# Patient Record
Sex: MALE | Race: WHITE | Employment: OTHER | URBAN - METROPOLITAN AREA
[De-identification: names, ages, dates, MRNs, and addresses within clinical notes are randomized per-mention and may not be internally consistent; named-entity substitution may affect disease eponyms.]

---

## 2020-10-05 NOTE — PROGRESS NOTES
Neurology Note    Patient ID:  Chano Sanchez  [id number]  74 y.o.  1960      Date of Consultation:  October 6, 2020    Referring Physician: Dr. Faiza Patel    Reason for Consultation:  Movement difficulties. Subjective: my memory and movement disorders. History of Present Illness:   Chano Sanchez is a 64 y.o. male who was referred to the neurology clinic at [facility] for an evaluation. The patient presents today with his sister provides additional information. He has had continued progressive neurological decline at least for the last year. It is a combination of both balance issues and memory issues. He reports that he has had a couple of falls. He is turning much slower than he was previously. He does not turn well to the left. From a cognitive standpoint, he is having trouble with word finding. He is much slower in his speech. He does work as an optometrist but patients have mention to him that he just does not seem to be like he was previously. He initially saw a neurologist in [place] and did have an open MRI which was reportedly normal.  He then did see a neurologist in [place], Dr. Matthew. A follow-up MRI was completed. There was also serology that was performed which was all reportedly normal.  Unfortunately, I do not have any of this lab test with me today however will work on obtaining those results by having him sign release of information today. He continues to follow closely with his primary care doctor Dr. Faiza Patel who has also done additional testing. There is also some difficulty with swallowing. He is scheduled to have a swallowing study done later this week. He is also seen ENT due to his balance difficulties and he is scheduled to see a balance therapist later this week. His vision has been okay. Prior to all this, the gentleman was very active and engaged. He was a scratch golfer.   He also had a girlfriend but since this has all began, he has become more withdrawn and he does not see his girlfriend anymore. He has lost approximately 10 pounds. He does have trouble staying asleep. He is taking trazodone and melatonin and is not doing better. He is also developed depression and has now been started on a medication by his primary care doctor. He also is working on seeing a psychologist but right now that has become difficult due to the pandemic. No past medical history on file. Does have cardiac disease and is status post bypass surgery      No past surgical history on file. Bypass surgery    No family history on file. Mother with parkinson's disease      Social History     Tobacco Use    Smoking status: Not on file   Substance Use Topics    Alcohol use: Not on file      Denies etoh and tobacco.  No other drug use. Allergies not on file     Prior to Admission medications    Medication Sig Start Date End Date Taking? Authorizing Provider   aspirin delayed-release 81 mg tablet Take  by mouth daily. Yes Provider, Historical   FLUoxetine (PROzac) 20 mg capsule Take  by mouth daily. Yes Provider, Historical   traZODone (DESYREL) 50 mg tablet Take  by mouth nightly. Yes Provider, Historical   melatonin 5 mg tablet Take 10 mg by mouth nightly. Yes Provider, Historical       Review of Systems:    General, constitutional: falls  Eyes, vision: negative  Ears, nose, throat: negative  Cardiovascular, heart: chest pain  Respiratory: negative  Gastrointestinal: swallowing difficulties.    Genitourinary: negative  Musculoskeletal: negative  Skin and integumentary: negative  Psychiatric: anxiety, depression  Endocrine: negative  Neurological: negative, except for HPI  Hematologic/lymphatic: negative  Allergy/immunology: negative      Objective:     Visit Vitals  /68 (BP 1 Location: Left arm, BP Patient Position: Sitting)   Pulse 90   Temp 97.5 °F (36.4 °C)   Resp 18   Ht 5' 9\" (1.753 m)   Wt 166 lb (75.3 kg)   SpO2 97% BMI 24.51 kg/m²       Physical Exam:      General:  appears well nourished in no acute distress  Neck: no carotid bruits  Lungs: clear to auscultation  Heart:  no murmurs, regular rate  Lower extremity: peripheral pulses palpable and no edema  Skin: intact    Neurological exam:    Awake, alert, oriented to person, place and time. He is slow to respond but then does respond with quick abrupt statements. There is no flow to his speech. Recent and remote memory were normal  Attention and concentration were most likely intact but difficult due to the significant delay in response. Language was intact. There was no aphasia  Speech: Mild spastic dysarthria  Fund of knowledge was preserved    Cranial nerves:   II-XII were tested    Perrrla  Fundoscopic examination revealed venous pulsations and no clear abnormalities  Visual fields were full  Eomi, no evidence of nystagmus. Smooth pursuits  Facial sensation:  normal and symmetric  Facial motor: normal and symmetric  Hearing intact  SCM strength intact  Tongue: midline without fasciculations    Motor: Tone normal.  I did not notice any cogwheel rigidity today    No evidence of fasciculations    Strength testing:   deltoid triceps biceps Wrist ext. Wrist flex. intrinsics Hip flex. Hip ext. Knee ext. Knee flex Dorsi flex Plantar flex   Right 5 5 5 5 5 5 5 5 5 5 5 5   Left 5 5 5 5 5 5 5 5 5 5 5 5       Sensory:  Upper extremity: intact to pp, light touch, and vibration > 10 seconds  Lower extremity: intact to pp, light touch, and vibration > 10 seconds    Reflexes:    Right Left  Biceps  2 2  Triceps 2 2  Brachiorad. 2 2  Patella  2 2  Achilles 1 1    Plantar response:  flexor bilaterally      Cerebellar testing:  No resting tremor apparent, finger/nose and connie were intact however there is inconsistently in movements. Irregular rhythm. No fluidity. Difficulty with initiation of movement. Romberg: absent    Gait: wide based. Shuffling gait. Decreased arm swing. Turns en bloc. Labs:     No results found for: HBA1C, NA, K, CL, GLU, BUN, CREA, CA, WBC, HCT, HGB, PLT, LDL, MUK3OWWA, HCTEXT, HGBEXT, PLTEXT, [id number], HCTEXT, HGBEXT, PLTEXT    Imaging:    No results found for this or any previous visit. No results found for this or any previous visit. Assessment and Plan:      The patient is a pleasant 72-year-old gentleman with progressive neurological decline including balance difficulties, coordination, and cognitive concerns. His neurological examination does reveal evidence of cognitive slowing, difficulty with coordinated movements and gait instability. progressive neurological decline: The differential for this is quite broad and does include progressive neurodegenerative disorder in the tauopathy or syncleinopathy families. Reportedly, he has had a brain MRI that was normal.  We will work on obtaining those results for my review. I would like to obtain a DaTscan to look for diseases such as cortical basal ganglionic degeneration, Parkinson's plus disorders. Also the differential includes paraneoplastic and autoimmune basal ganglionic degeneration. I will send off the [facility] panel. I will also need to obtain all the serology that has been performed as I am unclear what has been done. The patient and his sister are also unaware. Pending those results will need to consider sending off a additional serology. I also did talk with them that I do think he most likely will need a lumbar puncture performed looking for different autoimmune and infectious disorders. I will start with the serology and GEM scan and obtain prior records. Cognitive decline:  I will arrange for him to have neuropsychological testing with Dr. Alem Hurley to help delineate the cognitive impairment. He will return to clinic after the above testing. There is no problem list on file for this patient.                   Signed By:  Lorri Darling DO FAAN    October 6, 2020

## 2020-10-06 ENCOUNTER — OFFICE VISIT (OUTPATIENT)
Dept: NEUROLOGY | Age: 60
End: 2020-10-06
Payer: COMMERCIAL

## 2020-10-06 ENCOUNTER — TELEPHONE (OUTPATIENT)
Dept: NEUROLOGY | Age: 60
End: 2020-10-06

## 2020-10-06 VITALS
TEMPERATURE: 97.5 F | BODY MASS INDEX: 24.59 KG/M2 | WEIGHT: 166 LBS | RESPIRATION RATE: 18 BRPM | DIASTOLIC BLOOD PRESSURE: 68 MMHG | HEIGHT: 69 IN | HEART RATE: 90 BPM | SYSTOLIC BLOOD PRESSURE: 106 MMHG | OXYGEN SATURATION: 97 %

## 2020-10-06 DIAGNOSIS — G31.84 MCI (MILD COGNITIVE IMPAIRMENT): Primary | ICD-10-CM

## 2020-10-06 DIAGNOSIS — G25.9 MOVEMENT DISORDER: ICD-10-CM

## 2020-10-06 PROCEDURE — 99205 OFFICE O/P NEW HI 60 MIN: CPT | Performed by: PSYCHIATRY & NEUROLOGY

## 2020-10-06 RX ORDER — TRAZODONE HYDROCHLORIDE 50 MG/1
100 TABLET ORAL
COMMUNITY

## 2020-10-06 RX ORDER — ASPIRIN 81 MG/1
TABLET ORAL DAILY
COMMUNITY

## 2020-10-06 RX ORDER — CHOLECALCIFEROL (VITAMIN D3) 125 MCG
10 CAPSULE ORAL
COMMUNITY

## 2020-10-06 RX ORDER — FLUOXETINE HYDROCHLORIDE 20 MG/1
40 CAPSULE ORAL DAILY
COMMUNITY

## 2020-10-06 NOTE — LETTER
10/6/20 Patient: Estefany Richard YOB: 1960 Date of Visit: 10/6/2020 Uzma Alcazar MD 
[address] [place] [id number] VIA Facsimile: [phone] Dear Uzma Alcazar MD, Thank you for referring Mr. Estefany Richard to [address] [place] [address] for evaluation. My notes for this consultation are attached. If you have questions, please do not hesitate to call me. I look forward to following your patient along with you. Sincerely, Elias Ruano, DO

## 2020-10-06 NOTE — TELEPHONE ENCOUNTER
Left message regarding the lab requisition will be mailed out today. Please call to confirm the message was received.

## 2020-10-06 NOTE — TELEPHONE ENCOUNTER
Patient's sister returned call, ID verified times 2. She would like the lab slip mailed to her home due to the patient is staying with her. She would like to let Dr. Andrey Kelly know. The patient has an appointment with Alexandro Fernandez on 11/17/20. He is a neuropsychologist would Dr. Andrey Kelly prefer his colleague or keep the appointment with Dr. Sergoi Short.

## 2020-10-06 NOTE — PROGRESS NOTES
Visit Vitals  /68 (BP 1 Location: Left arm, BP Patient Position: Sitting)   Pulse 90   Temp 97.5 °F (36.4 °C)   Resp 18   Ht 5' 9\" (1.753 m)   Wt 166 lb (75.3 kg)   SpO2 97%   BMI 24.51 kg/m²     Chief Complaint   Patient presents with    Other     lateral movement is inhibited, weakness in all limbs and balance and swallowing issues     Memory Loss     sometimes he remembers things and other times he is just lost,      Depression screening completed patient has a 14 mild major depression is his diagnosis.  Discussed with Dr. Bryan chicas

## 2020-10-07 ENCOUNTER — HOSPITAL ENCOUNTER (OUTPATIENT)
Dept: GENERAL RADIOLOGY | Age: 60
Discharge: HOME | End: 2020-10-07
Attending: OTOLARYNGOLOGY
Payer: COMMERCIAL

## 2020-10-07 DIAGNOSIS — R49.0 HOARSENESS: ICD-10-CM

## 2020-10-07 DIAGNOSIS — T17.998A ASPIRATION OF LIQUID: ICD-10-CM

## 2020-10-07 DIAGNOSIS — G47.30 SLEEP APNEA: ICD-10-CM

## 2020-10-07 DIAGNOSIS — Z00.8 OTHER SPECIFIED GENERAL MEDICAL EXAMINATION: ICD-10-CM

## 2020-10-07 DIAGNOSIS — J38.3 SPASTIC DYSPHONIA: ICD-10-CM

## 2020-10-07 DIAGNOSIS — R13.14 DYSPHAGIA, PHARYNGOESOPHAGEAL PHASE: ICD-10-CM

## 2020-10-07 DIAGNOSIS — R27.0 ATAXIA: ICD-10-CM

## 2020-10-07 DIAGNOSIS — Z78.9 NON-SMOKER: ICD-10-CM

## 2020-10-07 PROCEDURE — 92611 MOTION FLUOROSCOPY/SWALLOW: CPT

## 2020-10-07 PROCEDURE — 74230 X-RAY XM SWLNG FUNCJ C+: CPT

## 2020-10-07 NOTE — PROGRESS NOTES
[facility], [address] [facility],[address] STUDY  Patient: Babak Martin (35 y.o. male)  Date: 10/7/2020  Referring Provider:  Dr. Marian Conde:   The patient was accompanied by his sister. 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 He was noticeably slower cognitively and with an unusual gait pattern. Affect was flat. OBJECTIVE:   Past Medical History:   No past medical history on file. No past surgical history on file. Current Dietary Status:    Radiologist: Dr. Mendoza Cheek Views: Lateral;Fluoro  Patient Position: standing upright    Trial 1:   Consistency Presented: Thin liquid;Puree;Mechanical soft;Pill/Tablet   How Presented: Self-fed/presented;Cup/sip;Straw;Successive swallows       Bolus Acceptance: No impairment   Bolus Formation/Control: No impairment:     Propulsion: No impairment   Oral Residue: None   Initiation of Swallow: Triggered at base of tongue; No impairment   Timing: No impairment;Pooling 1-5 sec   Penetration: Trace; Before swallow; To laryngeal vestibule;From initial swallow   Aspiration/Timing: Silent ;Trace; After;From initial swallow   Pharyngeal Clearance: No residue                       Decreased Tongue Base Retraction?: No  Laryngeal Elevation: WFL (within functional limits)  Aspiration/Penetration Score: 8 (Aspiration-Contrast passes cords/glottis with no effort to eject, ie/silent aspiration)  Pharyngeal Symmetry: Not assessed  Pharyngeal-Esophageal Segment: No impairment       Oral Phase Severity: No impairment  Pharyngeal Phase Severity: Mild    ASSESSMENT :  Based on the objective data described above, the patient presents with functional oral phase but mild pharyngeal dysphagia. He had a mild swallow delay with purees. He tolerated single cup sips and consecutive cup sips with no aspiration.  He had trace silent aspiration with straw sips only due to swallow delay and reduced closure during the swallow. No pharyngeal residue noted. PLAN/RECOMMENDATIONS :  No dietary changes needed at this point. Small sips, may want to avoid straws as this is when he had trace silent aspiration. Recommended he minimize distractions when eating and drinking. No follow up needed unless his dysphagia worsens. COMMUNICATION/EDUCATION:   The above findings and recommendations were discussed with: the patient and his sister who verbalized understanding.     Thank you for this referral.  Swapnil Ortiz, SLP  Time Calculation: 20 mins

## 2020-10-07 NOTE — TELEPHONE ENCOUNTER
Returned call to patient's sister,ID verified times 2. She was made aware of the message stated by Dr. Ophelia Agosto. Patient's sister verbalized understanding.

## 2020-10-08 ENCOUNTER — DOCUMENTATION ONLY (OUTPATIENT)
Dept: NEUROLOGY | Age: 60
End: 2020-10-08

## 2020-10-09 ENCOUNTER — TELEPHONE (OUTPATIENT)
Dept: NEUROLOGY | Age: 60
End: 2020-10-09

## 2020-10-09 NOTE — TELEPHONE ENCOUNTER
DaTscan order     CPT [id number] No PA required per Bonifacio 10/9/20 at 1:53 p.m. Call Ref# [id number]. Dr. Lauro Duff - in network in [place] - in network w/ [facility]     Faxed to Lindsey Granados at [facility], notes, card and order.

## 2020-10-09 NOTE — TELEPHONE ENCOUNTER
Hi,    Yes, Momo Nagy is working on getting the [address] at [facility]. That is the only place they do those. I believe the only other place is [facility]. We should not do a lumbar puncture without patient being comfortable with the procedure. That is why we are going to start by sending off the labs to the [facility]. I believe that lab slip was mailed to them this week.     Thanks, Rashaun Fields

## 2020-10-09 NOTE — TELEPHONE ENCOUNTER
Re: Labs ordered by Dr. Vu Stewart. Per Tavia Nobles, mail lab order to patient for [employer] draw and then [employer] will submit to [facility]. See scanned documents today. This will go out in Monday's, 10/12/20 mail to patient.

## 2020-10-09 NOTE — TELEPHONE ENCOUNTER
Patient sister would like to schedule LP, she states he does not want that but she can talk him into it. I told her that it was important that the patient was okay with getting the LP, but she wanted it addressed with you anyway. I let her know that I would message  You concerning the situation. She also stated that they were set up for the [address] [place] [address] with [facility]. I told her that I would look into that as I was unaware that [facility] had this imaging. That for this test we had to send it out to [facility]. I did talk with Coordination of Care and they do not believe that they have a DaTscan. I will call them back to let them know that we are working at getting this done at [address] [place] [address].     Please advise

## 2020-10-09 NOTE — TELEPHONE ENCOUNTER
Called and spoke with patient sister and let her know that as far as Coordination of Care is concerned that they do not have a DaTscan. I let her know that we are working on the Prior Auth for [address] [place] [address].

## 2020-10-13 ENCOUNTER — TELEPHONE (OUTPATIENT)
Dept: NEUROLOGY | Age: 60
End: 2020-10-13

## 2020-10-13 NOTE — TELEPHONE ENCOUNTER
Re: Sherley Pena    rec'd call from [address] [place] [address] - she asked that I get prior approval on the Isotope as well. They are seeing insurance plans kick back in addition to the authorizations we get for the CPT [address]35. CPT for the Isotope is . [address] [place] [address]  pt's plan does not require a prior auth (just as the [id number] did not require one either). Confirmation for this call today:   [id number].

## 2020-10-14 ENCOUNTER — HOSPITAL ENCOUNTER (OUTPATIENT)
Dept: LAB | Age: 60
Discharge: HOME | End: 2020-10-14
Payer: COMMERCIAL

## 2020-10-14 PROCEDURE — 36415 COLL VENOUS BLD VENIPUNCTURE: CPT

## 2020-10-29 ENCOUNTER — TELEPHONE (OUTPATIENT)
Dept: NEUROLOGY | Age: 60
End: 2020-10-29

## 2020-10-29 ENCOUNTER — OFFICE VISIT (OUTPATIENT)
Dept: NEUROLOGY | Age: 60
End: 2020-10-29
Payer: COMMERCIAL

## 2020-10-29 DIAGNOSIS — R41.844 FRONTAL LOBE AND EXECUTIVE FUNCTION DEFICIT: ICD-10-CM

## 2020-10-29 DIAGNOSIS — G31.9 NEURODEGENERATIVE DISORDER (HCC): Primary | ICD-10-CM

## 2020-10-29 DIAGNOSIS — G31.84 MILD COGNITIVE IMPAIRMENT WITH MEMORY LOSS: ICD-10-CM

## 2020-10-29 LAB
Lab: NORMAL
REFERRAL TEST CODE OR MNEMONIC: NORMAL

## 2020-10-29 PROCEDURE — 96116 NUBHVL XM PHYS/QHP 1ST HR: CPT | Performed by: PSYCHOLOGIST

## 2020-10-29 NOTE — PROGRESS NOTES
This note will not be viewable in [facility] for the following reason(s). Likely risk of substantial harm from data that is generated from this evaluation. He will receive a full report at the time of his feedback. [facility] at [address]    Office:  [phone]  Fax: [phone]                 Initial Office Exam    Patient Name: Gina Lloyd  Age: 64 y.o. Gender: male   [facility] 23 years  Handedness: right handed   Presenting Concern: Cognitive Decline  Primary Care Physician: Constantine Giron MD  Referring Provider: Iverson Gosselin, DO      REASON FOR REFERRAL:  This comprehensive and medically necessary neuropsychological assessment was requested to assist with a differential diagnosis of MCI. The use and purpose of this examination, as well as the extent and limitations of confidentiality, were explained prior to obtaining permission to participate. Instructions were provided regarding the necessity to put forth optimal effort and answer questions truthfully in order to obtain reliable and accurate test results. PERTINENT HISTORY:  Mr. Shaheen Liang presented for a neuropsychological assessment at the recommendation of his treating physician secondary to complaints of starting and completing activities, less engaged in activites, more withdrawn, less likely to maintain a conversation, jumps around in conversation, motorically agitated, depressed, sleep problems, feeling like he is a burden on others, feeling slowed down, impaired gait, and increased rigidity of movement in general resulting in frequent falls. He reportedly has lost 10 LBS in recent months. Mr. Shaheen Liang began noticing symptoms possibly 1.5 years ago.   From a brief review of his medical and personal history there has not been any other significant neurological injury or illness noted or reported. He did report experiencing depression or anxiety in the past last year and half. Alcohol abuse has been an issue in the past.    Mr. Annetta Rothman does not  report any problems at birth or difficulties meeting developmental milestones. He reports that he had an adequate level of family support and was not subject to trauma or abuse as a child. Mr. Annetta Rothman does not  report being retain in school or receiving special assistance in any of he classes or subjects. Mr. Annetta Rothman completed 20 years of education. Mr. Annetta Rothman does not  exercise on a regular basis and does maintain a balanced diet. He does not  report problems with sleep and does not  complain of pain. He does not  participate in mentally stimulating activities. Mr. Annetta Rothman does  have concerns medical and financial stressors. Mr. Annetta Rothman indicated that he is independent in his instrumental activities of daily living, including shopping, meal preparation, housekeeping, doing laundry, managing medications, and finances. Current Outpatient Medications   Medication Sig    aspirin delayed-release 81 mg tablet Take  by mouth daily.  FLUoxetine (PROzac) 20 mg capsule Take  by mouth daily.  traZODone (DESYREL) 50 mg tablet Take  by mouth nightly.  melatonin 5 mg tablet Take 10 mg by mouth nightly. No current facility-administered medications for this visit. No past medical history on file. No flowsheet data found. PHQ 9 Score: 14 (10/6/2020  8:12 AM)    No past surgical history on file. Social History     Socioeconomic History    Marital status:      Spouse name: Not on file    Number of children: Not on file    Years of education: Not on file    Highest education level: Not on file       No family history on file. CT Results (most recent):  No results found for this or any previous visit.   MRI Results (most recent):  No results found for this or any previous visit. MENTAL STATUS:    Orientation:  Oriented to year, season, day of month and day of week   Eye Contact:  Decreased, inconsistent   Motor Behavior:  Postural Rigidity, imbalance, and frequent falls   Speech:  Slow spastic dysarthria with word repetition   Thought Process: Mild tangentality   Thought Content: No of hallucinations or delusions   Suicidal ideations:  Mild suicidal ideation   Mood:   Depression and anxiety   Affect:   Flat to restricted affect, uses \"humor\" to manage anxiety. Concentration:   Mildly to moderately impaired   Abstraction:   Mildly impaired   Insight:   Adequate    Mild memory impairment, Construction Apraxia, poor planning (markedly impaired clock drawing), marked tremor when writing and drawing. On the Modified Mini-Mental Status Exam: 77/100 (< .01) Severely Impaired      DIAGNOSTIC IMPRESSIONS:    ICD-10-CM ICD-9-CM    1. Neurodegenerative disorder ([address]  G31.9 331.9    2. Mild cognitive impairment with memory loss  G31.84 331.83    3. Frontal lobe and executive function deficit  R41.844 799.55              PLAN:  1. Complete a comprehensive neuropsychological assessment to provide a differential diagnosis of presenting concerns as well as to assist with disposition and treatment planning as appropriate. 2. Consider compensatory and remedial cognitive training. 3. Consider Psychotherapy with a rehabilitation Psychologist or clinical psychologist with experience working with chronic conditions. Referral to [facility] or [facility]. 4. Consider an adaptive driving evaluation. 5. Consider referral for elder health nurse to provide an in-home functional assessment. 6. Consider placement issues to provide greater structure and supervision to ensure safety, health and well-being. [id number] x 1 Review of records. Face to face interview w/ patient. Determine test protocol: 60 minutes.  Total 1 unit        Kathleen Quiles, PhD, ABPP, LCP  Licensed Clinical Psychologist/ Neuropsychologist        This note will not be viewable in [address]

## 2020-10-29 NOTE — LETTER
10/29/20 Patient: Gina Lloyd YOB: 1960 Date of Visit: 10/29/2020 Romana Needle, DO 
[address] [place] [address] [address] [id number] VIA In Basket Dear Romana Needle, DO, Thank you for referring Mr. Gina Lloyd to [address] [place] [address] for evaluation. My notes for this consultation are attached. This note will not be viewable in [email or web address] for the following reason(s). Likely risk of substantial harm from data that is generated from this evaluation. He will receive a full report at the time of his feedback. [facility] at [address] Medical Office Building [address] Office:  [phone]  Fax: [phone] Initial Office Exam 
 
Patient Name: Gina Lloyd Age: 64 y.o. Gender: male Occupation: 20 years Handedness: right handed Presenting Concern: Cognitive Decline Primary Care Physician: Constantine Giron MD 
Referring Provider: Iverson Gosselin, DO 
 
 
REASON FOR REFERRAL: 
This comprehensive and medically necessary neuropsychological assessment was requested to assist with a differential diagnosis of MCI. The use and purpose of this examination, as well as the extent and limitations of confidentiality, were explained prior to obtaining permission to participate. Instructions were provided regarding the necessity to put forth optimal effort and answer questions truthfully in order to obtain reliable and accurate test results.  
 
PERTINENT HISTORY: 
Mr. Shaheen Liang presented for a neuropsychological assessment at the recommendation of his treating physician secondary to complaints of starting and completing activities, less engaged in activites, more withdrawn, less likely to maintain a conversation, jumps around in conversation, motorically agitated, depressed, sleep problems, feeling like he is a burden on others, feeling slowed down, impaired gait, and increased rigidity of movement in general resulting in frequent falls. He reportedly has lost 10 LBS in recent months. Mr. Stacey Barakat began noticing symptoms possibly 1.5 years ago. From a brief review of his medical and personal history there has not been any other significant neurological injury or illness noted or reported. He did report experiencing depression or anxiety in the past last year and half. Alcohol abuse has been an issue in the past. 
 
Mr. Stacey Barakat does not  report any problems at birth or difficulties meeting developmental milestones. He reports that he had an adequate level of family support and was not subject to trauma or abuse as a child. Mr. Stacey Barakat does not  report being retain in school or receiving special assistance in any of he classes or subjects. Mr. Stacey Barakat completed 20 years of education. Mr. Stacey Barakat does not  exercise on a regular basis and does maintain a balanced diet. He does not  report problems with sleep and does not  complain of pain. He does not  participate in mentally stimulating activities. Mr. Stacey Barakat does  have concerns medical and financial stressors. Mr. Stacey Barakat indicated that he is independent in his instrumental activities of daily living, including shopping, meal preparation, housekeeping, doing laundry, managing medications, and finances. Current Outpatient Medications Medication Sig  
 aspirin delayed-release 81 mg tablet Take  by mouth daily.  FLUoxetine (PROzac) 20 mg capsule Take  by mouth daily.  traZODone (DESYREL) 50 mg tablet Take  by mouth nightly.  melatonin 5 mg tablet Take 10 mg by mouth nightly. No current facility-administered medications for this visit. No past medical history on file. No flowsheet data found. PHQ 9 Score: 14 (10/6/2020  8:12 AM) No past surgical history on file. Social History Socioeconomic History  Marital status:  Spouse name: Not on file  Number of children: Not on file  Years of education: Not on file  Highest education level: Not on file No family history on file. CT Results (most recent): No results found for this or any previous visit. MRI Results (most recent): No results found for this or any previous visit. MENTAL STATUS: 
 
Orientation:  Oriented to year, season, day of month and day of week Eye Contact:  Decreased, inconsistent Motor Behavior:  Postural Rigidity, imbalance, and frequent falls Speech:  Slow spastic dysarthria with word repetition Thought Process: Mild tangentality Thought Content: No of hallucinations or delusions Suicidal ideations:  Mild suicidal ideation Mood:   Depression and anxiety Affect:   Flat to restricted affect, uses \"humor\" to manage anxiety. Concentration:   Mildly to moderately impaired Abstraction:   Mildly impaired Insight:   Adequate Mild memory impairment, Construction Apraxia, poor planning (markedly impaired clock drawing), marked tremor when writing and drawing. On the Modified Mini-Mental Status Exam: 77/100 (< .01) Severely Impaired DIAGNOSTIC IMPRESSIONS: 
  ICD-10-CM ICD-9-CM 1. Neurodegenerative disorder ([facility]  G31.9 331.9 2. Mild cognitive impairment with memory loss  G31.84 331.83   
3. Frontal lobe and executive function deficit  R41.844 799.55 PLAN: 
1. Complete a comprehensive neuropsychological assessment to provide a differential diagnosis of presenting concerns as well as to assist with disposition and treatment planning as appropriate. 2. Consider compensatory and remedial cognitive training. 3. Consider Psychotherapy with a rehabilitation Psychologist or clinical psychologist with experience working with chronic conditions.  Referral to [facility] or [facility]. 4. Consider an adaptive driving evaluation. 5. Consider referral for elder health nurse to provide an in-home functional assessment. 6. Consider placement issues to provide greater structure and supervision to ensure safety, health and well-being. 13512 x 1 Review of records. Face to face interview w/ patient. Determine test protocol: 60 minutes. Total 1 unit Rk Platt, PhD, ABPP, LCP Licensed Clinical Psychologist/ Neuropsychologist 
 
 
 
This note will not be viewable in [address] If you have questions, please do not hesitate to call me. I look forward to following your patient along with you.  
 
 
Sincerely, 
 
Rk Platt, PHD

## 2020-10-29 NOTE — TELEPHONE ENCOUNTER
Pt's sister would like to know if the labs have resulted. She is here in the office now with the patient for an appt with Dr. Qing Estrada.

## 2020-11-03 ENCOUNTER — TRANSCRIBE ORDER (OUTPATIENT)
Dept: NEUROLOGY | Age: 60
End: 2020-11-03

## 2020-11-03 NOTE — PROGRESS NOTES
Neurology Note    Patient ID:  Amrit Cantrell  [id number]  89 y.o.  1960      Date of Consultation:  November 4, 2020      Subjective: my memory and movement problems persist.        History of Present Illness:   Amrit Cantrell is a 64 y.o. male returns to the neurology clinic at [facility] for an evaluation. The patient was initially seen in the clinic on October 6, 2020. Please see my initial history and physical, examination, and treatment base plan from that day. Since that time, he has moved in with his sister. They do notice that his balance is getting slightly worse. He did have a fall in the bathroom which was later in the evening. He has trouble standing in 1 position. He is still having difficulty with falling asleep and staying asleep. His trazodone has been increased but has not shown much in the way of benefits. He did have the autoimmune encephalopathy panel sent to [facility] and that was unremarkable. He had his initial visit with Dr. Laya Solares from a neuropsychology standpoint. He does have his formal evaluation still pending. He has not been able to receive the DaTscan yet but that is scheduled for him next week. The patient does not go outside very much. His sister, who is with him today, does feel that he is quite resistant to going outside or to doing much of anything. There is no exercise or activity which is a notable change for the patient. No past medical history on file. Does have cardiac disease and is status post bypass surgery      No past surgical history on file. Bypass surgery    No family history on file. Mother with parkinson's disease  Nephew with schizophrenia      Social History     Tobacco Use    Smoking status: Not on file   Substance Use Topics    Alcohol use: Not on file      Denies etoh and tobacco.  No other drug use.        No Known Allergies     Prior to Admission medications    Medication Sig Start Date End Date Taking? Authorizing Provider   aspirin delayed-release 81 mg tablet Take  by mouth daily. Yes Provider, Historical   FLUoxetine (PROzac) 20 mg capsule Take 40 mg by mouth daily. Yes Provider, Historical   traZODone (DESYREL) 50 mg tablet Take 100 mg by mouth nightly. Yes Provider, Historical   melatonin 5 mg tablet Take 10 mg by mouth nightly. Yes Provider, Historical       Review of Systems:    General, constitutional: falls  Eyes, vision: negative  Ears, nose, throat: negative  Cardiovascular, heart: chest pain  Respiratory: negative  Gastrointestinal: swallowing difficulties. Genitourinary: negative  Musculoskeletal: negative  Skin and integumentary: negative  Psychiatric: anxiety, depression  Endocrine: negative  Neurological: negative, except for HPI  Hematologic/lymphatic: negative  Allergy/immunology: negative      Objective:     Visit Vitals  /72 (BP 1 Location: Right arm)   Pulse 94   Temp 97.5 °F (36.4 °C)   Wt 168 lb (76.2 kg)   SpO2 97%   BMI 24.81 kg/m²       Physical Exam:      General:  appears well nourished in no acute distress  Neck: no carotid bruits  Lungs: clear to auscultation  Heart:  no murmurs, regular rate  Lower extremity: peripheral pulses palpable and no edema  Skin: intact    Neurological exam:    Awake, alert, oriented to person, place and time. He is slow to respond but then does respond with quick abrupt statements. There is no flow to his speech. Recent and remote memory were normal  Attention and concentration were most likely intact but difficult due to the significant delay in response. Language was intact. There was no aphasia  Speech: Mild spastic dysarthria  Fund of knowledge was preserved    Cranial nerves:   II-XII were tested    Perrrla  Visual fields were full  Eomi, no evidence of nystagmus.   Smooth pursuits  Facial sensation:  normal and symmetric  Facial motor: normal and symmetric  Hearing intact  SCM strength intact  Tongue: midline without fasciculations    Motor: Tone normal.  I did not notice any cogwheel rigidity today    No evidence of fasciculations    Strength testing:   deltoid triceps biceps Wrist ext. Wrist flex. intrinsics Hip flex. Hip ext. Knee ext. Knee flex Dorsi flex Plantar flex   Right 5 5 5 5 5 5 5 5 5 5 5 5   Left 5 5 5 5 5 5 5 5 5 5 5 5       Sensory:  Upper extremity: intact to pp, light touch, and vibration   Lower extremity: intact to pp, light touch, and vibration     Reflexes:    Right Left  Biceps  2 2  Triceps 2 2  Brachiorad. 2 2  Patella  2 2  Achilles 1 1    Plantar response:  flexor bilaterally      Cerebellar testing:  No resting tremor apparent, finger/nose and connie were intact however there is inconsistently in movements. Irregular rhythm. No fluidity. Difficulty with initiation of movement. He is unable to perform rapid alternating movements or finger tapping with any fluidity. He could not complete repetitive tasks. Romberg: absent    Gait: wide based. Shuffling gait. Decreased arm swing. Turns en bloc. No retropulsion    Labs:     No results found for: HBA1C, NA, K, CL, GLU, BUN, CREA, CA, WBC, HCT, HGB, PLT, LDL, FCQ0ONYM, HCTEXT, HGBEXT, PLTEXT, QGP4INJR, HCTEXT, HGBEXT, PLTEXT    Imaging:    No results found for this or any previous visit. No results found for this or any previous visit. MRI was completed [facility] on February 12, 2020. There was mild cerebellar atrophy and mild nonspecific chronic white matter disease. Assessment and Plan:      The patient is a pleasant 61-year-old gentleman with progressive neurological decline including balance difficulties, coordination, and cognitive concerns. His neurological examination does reveal evidence of cognitive slowing, difficulty with coordinated movements and gait instability. progressive neurological decline:     The differential for this is quite broad and does include progressive neurodegenerative disorder in the tauopathy or syncleinopathy families. 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 Specific disorders would include PSP, cortical basal ganglionic degeneration, MSA. The paraneoplastic and autoimmune disorder testing was negative through the [facility]. He does have a follow-up visit scheduled with Dr. Renan Knowles for his neuropsychological testing. His DaTscan is scheduled for next week. From a safety perspective, I did discuss with him about getting a hiking stick to help with balance and stability. I did strongly encourage him to exercise or increase his activity. Insomnia:  He will continue with trazodone and melatonin for the time being. May consider adding a low-dose of Seroquel. Cognitive decline:  Continue to follow-up with Dr. Renan Knowles         There is no problem list on file for this patient. The patient should return to clinic after testing    Renewed medication: none today    I spent   45  minutes with the patient  with over 50 % of the time counseling and coordinating the care plan in regards to the diagnosis, diagnostic testing, and treatment plan. The patient had the ability to ask questions and all questions were answered.          Signed By:  Julia Valente DO FAAN    November 4, 2020

## 2020-11-04 ENCOUNTER — TELEPHONE (OUTPATIENT)
Dept: NEUROLOGY | Age: 60
End: 2020-11-04

## 2020-11-04 ENCOUNTER — OFFICE VISIT (OUTPATIENT)
Dept: NEUROLOGY | Age: 60
End: 2020-11-04
Payer: COMMERCIAL

## 2020-11-04 VITALS
DIASTOLIC BLOOD PRESSURE: 72 MMHG | WEIGHT: 168 LBS | HEART RATE: 94 BPM | SYSTOLIC BLOOD PRESSURE: 102 MMHG | BODY MASS INDEX: 24.81 KG/M2 | OXYGEN SATURATION: 97 % | TEMPERATURE: 97.5 F

## 2020-11-04 DIAGNOSIS — G25.9 MOVEMENT DISORDER: ICD-10-CM

## 2020-11-04 DIAGNOSIS — G31.9 NEURODEGENERATIVE DISORDER (HCC): Primary | ICD-10-CM

## 2020-11-04 PROCEDURE — 99215 OFFICE O/P EST HI 40 MIN: CPT | Performed by: PSYCHIATRY & NEUROLOGY

## 2020-11-09 ENCOUNTER — OFFICE VISIT (OUTPATIENT)
Dept: NEUROLOGY | Age: 60
End: 2020-11-09
Payer: COMMERCIAL

## 2020-11-09 DIAGNOSIS — F41.1 GENERALIZED ANXIETY DISORDER: ICD-10-CM

## 2020-11-09 DIAGNOSIS — R25.2 NEURODEGENERATIVE DISEASE WITH DEMENTIA, ATAXIA, AND SPASTICITY (HCC): Primary | ICD-10-CM

## 2020-11-09 DIAGNOSIS — F02.80 NEURODEGENERATIVE DISEASE WITH DEMENTIA, ATAXIA, AND SPASTICITY (HCC): Primary | ICD-10-CM

## 2020-11-09 DIAGNOSIS — G31.9 NEURODEGENERATIVE DISEASE WITH DEMENTIA, ATAXIA, AND SPASTICITY (HCC): Primary | ICD-10-CM

## 2020-11-09 DIAGNOSIS — R27.0 NEURODEGENERATIVE DISEASE WITH DEMENTIA, ATAXIA, AND SPASTICITY (HCC): Primary | ICD-10-CM

## 2020-11-09 DIAGNOSIS — F33.1 MODERATE EPISODE OF RECURRENT MAJOR DEPRESSIVE DISORDER (HCC): ICD-10-CM

## 2020-11-09 PROCEDURE — 96136 PSYCL/NRPSYC TST PHY/QHP 1ST: CPT | Performed by: PSYCHOLOGIST

## 2020-11-09 PROCEDURE — 96139 PSYCL/NRPSYC TST TECH EA: CPT | Performed by: PSYCHOLOGIST

## 2020-11-09 PROCEDURE — 96132 NRPSYC TST EVAL PHYS/QHP 1ST: CPT | Performed by: PSYCHOLOGIST

## 2020-11-09 PROCEDURE — 96137 PSYCL/NRPSYC TST PHY/QHP EA: CPT | Performed by: PSYCHOLOGIST

## 2020-11-09 PROCEDURE — 96133 NRPSYC TST EVAL PHYS/QHP EA: CPT | Performed by: PSYCHOLOGIST

## 2020-11-09 PROCEDURE — 96138 PSYCL/NRPSYC TECH 1ST: CPT | Performed by: PSYCHOLOGIST

## 2020-11-09 NOTE — PROGRESS NOTES
This note will not be viewable in [facility] for the following reason(s). Likely risk of substantial harm from the misinterpretation of data generated by this evaluation. She will receive a full written report at the feedback session. [facility] at [address]    Office:  [phone]  Fax: [phone]                                            Neuropsychological Evaluation Report    Patient Name: Stephen Blake  Age: 64 y.o. Gender: male   [address] [facility] 23 years  Handedness: right handed   Presenting Concern: Cognitive Decline  Primary Care Physician: Broderick Mix MD  Referring Provider: Layo Goss DO     PATIENT HISTORY (OBTAINED DURING INITIAL CLINICAL EVALUATION):       REASON FOR REFERRAL:  This comprehensive and medically necessary neuropsychological assessment was requested to assist with a differential diagnosis of MCI. The use and purpose of this examination, as well as the extent and limitations of confidentiality, were explained prior to obtaining permission to participate. Instructions were provided regarding the necessity to put forth optimal effort and answer questions truthfully in order to obtain reliable and accurate test results.     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 He reportedly has lost 10 LBS in recent months. Dr. Mag Horton began noticing symptoms possibly 1.5 years ago.   From a brief review of his medical and personal history there has not been any other significant neurological injury or illness noted or reported. He did report experiencing depression or anxiety in the past last year and half. Alcohol abuse has been an issue in the past.     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 He reports that he had an adequate level of family support and was not subject to trauma or abuse as a child. Dr. Jose Arreaga does not  report being retain in school or receiving special assistance in any of he classes or subjects. Dr. Jose Arreaga completed 20 years of education.     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 He does not  report problems with sleep and does not  complain of pain. He does not  participate in mentally stimulating activities. Dr. Jose Arreaga does  have concerns medical and financial stressors. Dr. Jose Arreaga indicated that he is independent in his instrumental activities of daily living, including shopping, meal preparation, housekeeping, doing laundry, managing medications, and finances.       METHODS OF ASSESSMENT (Current Evaluation):  Clinician Administered:  Clinical Interview  Review of Medical Records  Clock Drawing Task  Modified Mini-Mental Status Exam (3MS)  Test of Premorbid Functioning  Calzada Depression Inventory  State-Trait Anxiety Inventory (STAXI)  Revised Memory and Behavior Checklist    Technician Administered:  [employer] [place] Test  Finger Oscillation Test   Strength Test  Grooved Peg Board Test  Neuropsychological Assessment Battery   NAB: Attention Module   NAB: Executive Functions Module   NAB: Language Module   NAB: Memory Module   NAB: Spatial Module  Test of Memory Malingering  Test of Practical Judgment (9-Item)  Test of Premorbid Functioning  Trail Making Test    TEST OBSERVATIONS:  Dr. Jose Arreaga arrived promptly for the testing session.   Dress and grooming were appropriate; physical presentation was unchanged from that observed during the clinical interview. Speech was fluent, intelligible, and goal-directed. Affect was congruent with the euthymic mood conveyed. Dr. Jose Angel Valencia was adequately cooperative and appeared to put forth good effort throughout this examination. Rapport with the examiner was adequately established and maintained. Minimal prompting was required. Comprehension of test instructions was not problematic. Performance motivation was objectively measured by two instruments (TOMM, Reliable Digit Span), and Dr. Jose Angel Valencia produced a normal score on two of these measures. Accordingly, test findings below do not appear to be the product of disingenuous effort. Given the above observations, plus comments contained in the Mental Status section, the results of this examination are regarded as reasonably reliable and valid. TEST RESULTS:  Quantitative test results are derived from comparisons to age and education corrected cohort normative data, where applicable. Percentiles are included in these instances. Qualitative test results are determined using clinical observations. General Orientation and Awareness:       Orientation to person, year, month, day of month, day of week, state, town, and circumstance.    Awareness of deficits Aware                 Cognitive performance validity testing          Sensory-Perceptual and Motor Functioning:    Classification:  Manual dexterity right dominant hand (<0.1 percentile)                Severely Impaired  Manual dexterity left nondominant hand (<0.1 percentile)             Severely Impaired  Simple fine motor speed  right dominant hand (0.3 percentile)       Severely Impaired  Simple fine motor speed  left nondominant hand (1 percentile    Severely Impaired   strength right dominant hand (3 percentile)                        Moderately Impaired   strength left nondominant hand (<0.1 percentile)   Severely Impaired    Attention/Concentration:             Simple visuomotor tracking (<1 percentile)               Severely Impaired  Digits forward (38 percentile)                 Average  Digits backward (2 percentile)                 Moderately Impaired  Visual scanning (1 percentile)                            Severely Impaired  Simple information processing  efficiency (1 percentile)   Severely Impaired   Complex information processing efficiency (<1 percentile)  Severely Impaired  Attention to visual detail of driving scenes (<1 percentile)             Severely Impaired    Visuospatial and Constructional Praxis:     Visual discrimination (21 percentile)                            Low Average   Design construction (1 percentile)                 Severely Impaired  Figure drawing copy (16 percentile)                                                  Low Average     Language:         Expressive speech in oral production (4 percentile)       Moderately Impaired  Auditory comprehension (66 percentile)                   Average   Naming (54 percentile)          Average  Reading comprehension (50 percentile)        Average   Writing (<1 percentile)                     Severely Impaired   Bill payment task (5 percentile)                    Mildly Impaired    Memory and Learning:       Word list immediate recall (<1 percentile)                Severely Impaired  Word list short delayed recall (<1 percentile)                Severely Impaired  Word list long delayed recall (<1 percentile)                           Severely Impaired  Shape learning immediate recognition (18 percentile)    Low Average    Shape learning delayed recognition (4 percentile)               Moderately Impaired  Story learning immediate recall (<1 percentile)     Severely Impaired  Story learning delayed recall (1 percentile)                          Severely Impaired  Daily living memory immediate recall (1 percentile)    Severely Impaired  Daily living memory delayed recall (<1 percentile)              Severely Impaired    Cognitive Tests of Executive Functioning:     Trail Making B (<1 percentile)                  Severely Impaired  Tillar test (14 percentile)                      Low Average  Mazes (2 percentile)                   Moderately Impaired  Simple judgment in daily decision making (21 percentile)              Low Average  Complex judgment in daily decision making (2 percentile)               Moderately Impaired  Categories (1 percentile)                              Severely Impaired  Word generation (1 percentile)                             Severely Impaired      Intellectual and Basic Cognitive Functioning (WAIS-IV):  ACS Test of pre-morbid functioning reading recognition lower limit estimated WAIS-IV FSIQ score:       Estimated Full Scale IQ:                101    52 percentile     Average     Emotional Functioning:   BDI-2:       25  Moderate Depression   STAXI:      State:  Mild Anxiety (10%ile) Trait:   Severe Anxiety (< 1%ile)    IMPRESSIONS:   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 His overall performance was in the moderate to severe range. Domain-level performance ranged from mildly impaired to severely impaired. Relative strengths at the Domain level were noted on the domains of visuospatial and language domains with performances in the mildly impaired range. Dr. Mariana Krueger greatest neurocognitive deficit was on the domain of memory functioning which fell in the severely impaired range. Performances on the domains of attention and executive functioning were in the moderately impaired range.   Although the majority of the individual measures fell in the impaired range, he continues to demonstrate relative strengths on measures in the areas of auditory attention, visual discrimination, constructional praxis, auditory comprehension, confrontational naming, reading comprehension, shape learning recognition, visual planning, and simple judgment in decision making. Symptomatic of his motor impairments, assessment of upper extremity strength, dexterity, fine motor speed was severely impaired. In summary, Dr. Rich Morse is experiencing a significant cognitive impairment consistent with a major neurocognitive disorder/neurodegenerative/ dementia that is associated with his unspecified movement disorder. DIAGNOSTIC IMPRESSIONS:    ICD-10-CM ICD-9-CM    1. Neurodegenerative disease with dementia, ataxia, and spasticity ([facility])  G31.9 331.9 NV NEUROPSYCHOLOGICAL TST EVAL PHYS/QHP 1ST HOUR    F02.80 294.10 NV NEUROPSYCHOLOGICAL TST EVAL PHYS/QHP EA ADDL HR    R27.0 781.0 NV PSYL/NRPSYCL TST PHYS/QHP 2+ TST 1ST 30 MIN    R25.2 781.3 NV PSYCL/NRPSYCL TST PHYS/QHP 2+ TST EA ADDL 30 MIN      NV PSYCL/NRPSYCL TST TECH 2+ TST 1ST 30 MIN      NV PSYCL/NRPSYCL TST TECH 2+ TST EA ADDL 30 MIN      NV PSYCL/NRPSYCL TST TECH 2+ TST EA ADDL 30 MIN      NV PSYCL/NRPSYCL TST TECH 2+ TST EA ADDL 30 MIN      NV PSYCL/NRPSYCL TST TECH 2+ TST EA ADDL 30 MIN      NV PSYCL/NRPSYCL TST PHYS/QHP 2+ TST EA ADDL 30 MIN      NV PSYCL/NRPSYCL TST TECH 2+ TST EA ADDL 30 MIN   2. Moderate episode of recurrent major depressive disorder ([facility])  F33.1 296.32 [place] NEUROPSYCHOLOGICAL TST EVAL PHYS/QHP 1ST HOUR      NV NEUROPSYCHOLOGICAL TST EVAL PHYS/QHP EA ADDL HR      NV PSYL/NRPSYCL TST PHYS/QHP 2+ TST 1ST 30 MIN      NV PSYCL/NRPSYCL TST PHYS/QHP 2+ TST EA ADDL 30 MIN      NV PSYCL/NRPSYCL TST PHYS/QHP 2+ TST EA ADDL 30 MIN   3.  Generalized anxiety disorder  F41.1 300.02 [place] NEUROPSYCHOLOGICAL TST EVAL PHYS/QHP 1ST HOUR      NV NEUROPSYCHOLOGICAL TST EVAL PHYS/QHP EA ADDL HR      NV PSYL/NRPSYCL TST PHYS/QHP 2+ TST 1ST 30 MIN      NV PSYCL/NRPSYCL TST PHYS/QHP 2+ TST EA ADDL 30 MIN      NV PSYCL/NRPSYCL TST PHYS/QHP 2+ TST EA ADDL 30 MIN         RECOMMENDATIONS:   1. Findings should be reviewed with Dr. Miguel Dockery to insure her understanding and discuss the potential implications. 2. Emphasis should be placed on Dr. Miguel Dockery obtaining good sleep hygiene and maintaining adequate physical exercise to promote good brain health. 3. Dr. Miguel Dockery may benefit from a referral to psychotherapy to address anxiety and work on adequate stress management skills. 4. Dr. Miguel Dockery is encouraged to seek out various forms of mental stimulation that would help to \"exercise\" her brain. This might include learning a new skill, hobby, travel, attending lectures, or evening reading or listening to podcasts or videos on topics of her interest.      Thank you for allowing me the opportunity to assist you in Dr. Greer macdonald. Please do not hesitate to contact me should you have additional questions that I may not have addressed. 06641 x 1  96138 x 1  96139 x 6  96132 x 1  96133 x 2          Merna Mason, Ph.D., ABPP  Licensed Clinical Psychologist  Neuropsychologist  Board Certified Rehabilitation Psychologist      Time Documentation:     45883 x 1: Neurobehavioral Status Exam/Clinical Interview: (1 hour, (already billed on first date of service)     99955*3 Neuropsych testing/data gathering by Neuropsychologist (35 additional minutes, see above)      96138 x 1  96139 x 6 Test Administration/Data Gathering By Technician: (3.5 hours). 03640 x 1 (first 30 minutes), 58399 x 7 (each additional 30 minutes)     96132 x 1  96133 x 1 Testing Evaluation Services by Neuropsychologist (1 hour, 50 minutes) 96132 x 1 (first hour), 96133 x 1 (50 minutes)     Definitions:       [id number]/[id number]:  Neurobehavioral Status Exam, Clinical interview.   Clinical assessment of thinking, reasoning and judgment, by neuropsychologist, both face to face time with patient and time interpreting those test results and reporting, first and subsequent hours)     57833/46242: Neuropsychological Test Administration by Technician/Psychometrist, first 30 minutes and each additional 30 minutes. The above includes: Record review. Review of history provided by patient. Review of collaborative information. Testing by Clinician. Review of raw data. Scoring. Report writing of individual tests administered by Clinician. Integration of individual tests administered by psychometrist with NSE/testing by clinician, review of records/history/collaborative information, case Conceptualization, treatment planning, clinical decision making, report writing, coordination Of Care. Psychometry test codes as time spent by psychometrist administering and scoring neurocognitive/psychological tests under supervision of neuropsychologist.  Integral services including scoring of raw data, data interpretation, case conceptualization, report writing etcetera were initiated after the patient finished testing/raw data collected and was completed on the date the report was signed. This note will not be viewable in [address]

## 2020-11-09 NOTE — LETTER
11/10/20 Patient: Minesh Marcial YOB: 1960 Date of Visit: 11/9/2020 Mabel Baker MD 
[address] [facility] [address] VIA Facsimile: [phone] Norberto Hanna DO 
[address] [phone] VIA In Basket Dear MD Norberto William DO, Thank you for referring Mr. Minesh Marcial to [facility]  for evaluation. My notes for this consultation are attached. This note will not be viewable in [employer] for the following reason(s). Likely risk of substantial harm from the misinterpretation of data generated by this evaluation. She will receive a full written report at the feedback session. [facility] at [place] [address], Medical Office Building [address] Office:  [phone]  Fax: [phone] Neuropsychological Evaluation Report Patient Name: Minesh Marcial Age: 64 y.o. Gender: male Occupation: 20 years Handedness: right handed Presenting Concern: Cognitive Decline Primary Care Physician: Gaby Del Angel MD 
Referring Provider: Toni Jerez DO 
  
PATIENT HISTORY (OBTAINED DURING INITIAL CLINICAL EVALUATION): 
 
  
REASON FOR REFERRAL: 
This comprehensive and medically necessary neuropsychological assessment was requested to assist with a differential diagnosis of MCI. The use and purpose of this examination, as well as the extent and limitations of confidentiality, were explained prior to obtaining permission to participate.   Instructions were provided regarding the necessity to put forth optimal effort and answer questions truthfully in order to obtain reliable and accurate test results. 
  
PERTINENT HISTORY: 
 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 He reportedly has lost 10 LBS in recent months.  Joaquin Maldonadoroger began noticing symptoms possibly 1.5 years ago. From a brief review of his medical and personal history there has not been any other significant neurological injury or illness noted or reported. He did report experiencing depression or anxiety in the past last year and half. Alcohol abuse has been an issue in the past. 
  
 Unñez Tia does not  report any problems at birth or difficulties meeting developmental milestones. He reports that he had an adequate level of family support and was not subject to trauma or abuse as a child.  Joaquin Tia does not  report being retain in school or receiving special assistance in any of he classes or subjects. 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 
  
Sameer Joaquin Tia does not  exercise on a regular basis and does maintain a balanced diet. He does not  report problems with sleep and does not  complain of pain. He does not  participate in mentally stimulating activities.  Joaquin Maldonadoroger does  have concerns medical and financial stressors. Dr. Joaquin Weber indicated that he is independent in his instrumental activities of daily living, including shopping, meal preparation, housekeeping, doing laundry, managing medications, and finances. 
  
 
METHODS OF ASSESSMENT (Current Evaluation): 
Clinician Administered: 
Clinical Interview Review of Medical Records Clock Drawing Task Modified Mini-Mental Status Exam (3MS) Test of Premorbid Functioning Calzada Depression Inventory State-Trait Anxiety Inventory (STAXI) Revised Memory and Behavior Checklist 
 
Technician Administered: 
[address] [employer] Test 
Neuropsychological Assessment Battery NAB: Attention Module NAB: Executive Functions Module NAB: Language Module NAB: Memory Module NAB: Spatial Module Test of Memory Malingering Test of Practical Judgment (9-Item) Test of Premorbid Functioning Trail Making Test 
 
TEST OBSERVATIONS: 
Dr. Andres Montoya arrived promptly for the testing session. Dress and grooming were appropriate; physical presentation was unchanged from that observed during the clinical interview. Speech was fluent, intelligible, and goal-directed. Affect was congruent with the euthymic mood conveyed. Dr. Andres Montoya was adequately cooperative and appeared to put forth good effort throughout this examination. Rapport with the examiner was adequately established and maintained. Minimal prompting was required. Comprehension of test instructions was not problematic. Performance motivation was objectively measured by two instruments (TOMM, Reliable Digit Span), and Dr. Andres Montoya produced a normal score on two of these measures. Accordingly, test findings below do not appear to be the product of disingenuous effort. Given the above observations, plus comments contained in the Mental Status section, the results of this examination are regarded as reasonably reliable and valid. TEST RESULTS: 
Quantitative test results are derived from comparisons to age and education corrected cohort normative data, where applicable. Percentiles are included in these instances. Qualitative test results are determined using clinical observations. General Orientation and Awareness:      
Orientation to person, year, month, day of month, day of week, state, town, and circumstance. Awareness of deficits Aware Cognitive performance validity testing Sensory-Perceptual and Motor Functioning:    Classification: 
Manual dexterity right dominant hand (<0.1 percentile)                Severely Impaired Manual dexterity left nondominant hand (<0.1 percentile)             Severely Impaired Simple fine motor speed  right dominant hand (0.3 percentile)       Severely Impaired Simple fine motor speed  left nondominant hand (1 percentile    Severely Impaired  strength right dominant hand (3 percentile)                        Moderately Impaired  strength left nondominant hand (<0.1 percentile)   Severely Impaired Attention/Concentration:      
      Simple visuomotor tracking (<1 percentile)               Severely Impaired Digits forward (38 percentile)                 Average Digits backward (2 percentile)                 Moderately Impaired Visual scanning (1 percentile)                            Severely Impaired Simple information processing  efficiency (1 percentile)   Severely Impaired Complex information processing efficiency (<1 percentile)  Severely Impaired Attention to visual detail of driving scenes (<1 percentile)             Severely Impaired Visuospatial and Constructional Praxis:    
Visual discrimination (21 percentile)                            Low Average Design construction (1 percentile)                 Severely Impaired Figure drawing copy (16 percentile)                                                  Low Average Language:        
Expressive speech in oral production (4 percentile)       Moderately Impaired Auditory comprehension (66 percentile)                   Average Naming (54 percentile)          Average Reading comprehension (50 percentile)        Average Writing (<1 percentile)                     Severely Impaired Bill payment task (5 percentile)                    Mildly Impaired Memory and Learning: Word list immediate recall (<1 percentile)                Severely Impaired Word list short delayed recall (<1 percentile)                Severely Impaired Word list long delayed recall (<1 percentile)                           Severely Impaired Shape learning immediate recognition (18 percentile)    Low Average Shape learning delayed recognition (4 percentile)               Moderately Impaired Story learning immediate recall (<1 percentile)     Severely Impaired Story learning delayed recall (1 percentile)                          Severely Impaired Daily living memory immediate recall (1 percentile)    Severely Impaired Daily living memory delayed recall (<1 percentile)              Severely Impaired Cognitive Tests of Executive Functioning:    
Trail Making B (<1 percentile)                  Severely Impaired Inlet test (14 percentile)                      Low Average Mazes (2 percentile)                   Moderately Impaired Simple judgment in daily decision making (21 percentile)              Low Average Complex judgment in daily decision making (2 percentile)               Moderately Impaired Categories (1 percentile)                              Severely Impaired Word generation (1 percentile)                             Severely Impaired Intellectual and Basic Cognitive Functioning (WAIS-IV): 
ACS Test of pre-morbid functioning reading recognition lower limit estimated WAIS-IV FSIQ score: 
     Estimated Full Scale IQ:                101    52 percentile     Average Emotional Functioning: BDI-2:       25  Moderate Depression STAXI:      State:  Mild Anxiety (10%ile) Trait:   Severe Anxiety (< 1%ile) IMPRESSIONS: 
 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 His overall performance was in the moderate to severe range. Domain-level performance ranged from mildly impaired to severely impaired. Relative strengths at the Domain level were noted on the domains of visuospatial and language domains with performances in the mildly impaired range. Dr. Khloededamaris Norma greatest neurocognitive deficit was on the domain of memory functioning which fell in the severely impaired range. Performances on the domains of attention and executive functioning were in the moderately impaired range. Although the majority of the individual measures fell in the impaired range, he continues to demonstrate relative strengths on measures in the areas of auditory attention, visual discrimination, constructional praxis, auditory comprehension, confrontational naming, reading comprehension, shape learning recognition, visual planning, and simple judgment in decision making. Symptomatic of his motor impairments, assessment of upper extremity strength, dexterity, fine motor speed was severely impaired. In summary, Dr. Burak Hernandez is experiencing a significant cognitive impairment consistent with a major neurocognitive disorder/neurodegenerative/ dementia that is associated with his unspecified movement disorder. DIAGNOSTIC IMPRESSIONS: 
  ICD-10-CM ICD-9-CM 1.  Neurodegenerative disease with dementia, ataxia, and spasticity ([facility])  G31.9 331.9 WA NEUROPSYCHOLOGICAL TST EVAL PHYS/QHP 1ST HOUR  
 F02.80 294.10 WA NEUROPSYCHOLOGICAL TST EVAL PHYS/QHP EA ADDL HR  
 R27.0 781.0 WA PSYL/NRPSYCL TST PHYS/QHP 2+ TST 1ST 30 MIN  
 R25.2 781.3 WA PSYCL/NRPSYCL TST PHYS/QHP 2+ TST EA ADDL 30 MIN  
   WA PSYCL/NRPSYCL TST TECH 2+ TST 1ST 30 MIN  
   WA PSYCL/NRPSYCL TST TECH 2+ TST EA ADDL 30 MIN  
 ME PSYCL/NRPSYCL TST TECH 2+ TST EA ADDL 30 MIN  
   ME PSYCL/NRPSYCL TST PHYS/QHP 2+ TST EA ADDL 30 MIN  
   ME PSYCL/NRPSYCL TST TECH 2+ TST EA ADDL 30 MIN 2. Moderate episode of recurrent major depressive disorder (HCC)  F33.1 296.32 ME NEUROPSYCHOLOGICAL TST EVAL PHYS/QHP 1ST HOUR  
   ME NEUROPSYCHOLOGICAL TST EVAL PHYS/QHP EA ADDL HR  
   ME PSYL/NRPSYCL TST PHYS/QHP 2+ TST 1ST 30 MIN  
   ME PSYCL/NRPSYCL TST PHYS/QHP 2+ TST EA ADDL 30 MIN  
   ME PSYCL/NRPSYCL TST PHYS/QHP 2+ TST EA ADDL 30 MIN 3. Generalized anxiety disorder  F41.1 300.02 ME NEUROPSYCHOLOGICAL TST EVAL PHYS/QHP 1ST HOUR  
   ME NEUROPSYCHOLOGICAL TST EVAL PHYS/QHP EA ADDL HR  
   ME PSYL/NRPSYCL TST PHYS/QHP 2+ TST 1ST 30 MIN  
   ME PSYCL/NRPSYCL TST PHYS/QHP 2+ TST EA ADDL 30 MIN  
   ME PSYCL/NRPSYCL TST PHYS/QHP 2+ TST EA ADDL 30 MIN  
 
 
 
RECOMMENDATIONS:  
1. Findings should be reviewed with Dr. Smith Barlow to insure her understanding and discuss the potential implications. 2. Emphasis should be placed on Dr. Smith Barlow obtaining good sleep hygiene and maintaining adequate physical exercise to promote good brain health. 3. Dr. Smith Barlow may benefit from a referral to psychotherapy to address anxiety and work on adequate stress management skills. 4. Dr. Smith Barlow is encouraged to seek out various forms of mental stimulation that would help to \"exercise\" her brain. This might include learning a new skill, hobby, travel, attending lectures, or evening reading or listening to podcasts or videos on topics of her interest. 
 
 
Thank you for allowing me the opportunity to assist you in Dr. Winfield Bloch care. Please do not hesitate to contact me should you have additional questions that I may not have addressed. 96136 x 1 
96138 x 1 
96139 x 6 
96132 x 1 
96133 x 2 Jamari Salgado, Ph.D., ABPP Licensed Clinical Psychologist 
 Neuropsychologist 
Board Certified Rehabilitation Psychologist 
 
 
Time Documentation: 
  
75150 x 1: Neurobehavioral Status Exam/Clinical Interview: (1 hour, (already billed on first date of service) 71419*5 Neuropsych testing/data gathering by Neuropsychologist (35 additional minutes, see above) 90556 x 1 
96139 x 6 Test Administration/Data Gathering By Technician: (3.5 hours). 30345 x 1 (first 30 minutes), 96139 x 7 (each additional 30 minutes) 47319 x 1 
96133 x 1 Testing Evaluation Services by Neuropsychologist (1 hour, 50 minutes) 96132 x 1 (first hour), 96133 x 1 (50 minutes) Definitions:   
  
24942/31633:  Neurobehavioral Status Exam, Clinical interview. Clinical assessment of thinking, reasoning and judgment, by neuropsychologist, both face to face time with patient and time interpreting those test results and reporting, first and subsequent hours) 73863/99714: Neuropsychological Test Administration by Technician/Psychometrist, first 30 minutes and each additional 30 minutes. The above includes: Record review. Review of history provided by patient. Review of collaborative information. Testing by Clinician. Review of raw data. Scoring. Report writing of individual tests administered by Clinician. Integration of individual tests administered by psychometrist with NSE/testing by clinician, review of records/history/collaborative information, case Conceptualization, treatment planning, clinical decision making, report writing, coordination Of Care. Psychometry test codes as time spent by psychometrist administering and scoring neurocognitive/psychological tests under supervision of neuropsychologist.  Integral services including scoring of raw data, data interpretation, case conceptualization, report writing etcetera were initiated after the patient finished testing/raw data collected and was completed on the date the report was signed. This note will not be viewable in [address] If you have questions, please do not hesitate to call me. I look forward to following your patient along with you.  
 
 
Sincerely, 
 
Vandana Carter, PHD

## 2020-11-17 ENCOUNTER — OFFICE VISIT (OUTPATIENT)
Dept: NEUROLOGY | Age: 60
End: 2020-11-17
Payer: COMMERCIAL

## 2020-11-17 DIAGNOSIS — G23.1 PSP (PROGRESSIVE SUPRANUCLEAR PALSY) (HCC): Primary | ICD-10-CM

## 2020-11-17 PROCEDURE — 90832 PSYTX W PT 30 MINUTES: CPT | Performed by: PSYCHOLOGIST

## 2020-11-17 NOTE — PROGRESS NOTES
Amrit Cantrell is a 64 y.o. male who presents today for feedback following recent neuropsychological testing. The results were reviewed of the recent neuropsychological evaluation, including discussing individual tests as well as the patient's areas of neurocognitive strength versus their weaknesses. Education was provided regarding my diagnostic impressions, and we discussed next steps for further evaluation down the road. I all also answered numerous questions related to the clinical findings, including discussing various methods to improve cognitive cognition and mood when relevant. The patient is encouraged to follow-up with the referring provider. Mr. Jackelin Connell was provided a copy of his results during this session. DIAGNOSTIC IMPRESSIONS:    ICD-10-CM ICD-9-CM    1.  PSP (progressive supranuclear palsy) ([facility])  G23.1 333.0        78753 30 minutes x 1    Pavel Perez, PHD

## 2020-11-18 ENCOUNTER — TELEPHONE (OUTPATIENT)
Dept: NEUROLOGY | Age: 60
End: 2020-11-18

## 2021-03-02 ENCOUNTER — TELEPHONE (OUTPATIENT)
Dept: SLEEP MEDICINE | Age: 61
End: 2021-03-02

## 2021-03-02 NOTE — TELEPHONE ENCOUNTER
Referred directly from Dr. Melia Ruano's office for sleep study. Scheduled appointment with Ms. Chantale Abbott (sister) who is listed in patient's PHI. Pre-Study instructions sent via [facility] and sleep questionnaires for completion via email to listed email address in chart. A prior authorization will be obtained based on neuro clinical notes and completed sleep questionnaires. Should study be denied, a sleep medicine consult will be scheduled. Ms. Chavez Lobe agreed to above and expressed understanding.

## 2021-07-08 ENCOUNTER — TRANSCRIBE ORDER (OUTPATIENT)
Dept: SCHEDULING | Age: 61
End: 2021-07-08

## 2021-07-08 DIAGNOSIS — R41.89 SIGNS AND SYMPTOMS INVOLVING COGNITION: Primary | ICD-10-CM

## 2021-08-14 ENCOUNTER — TRANSCRIBE ORDER (OUTPATIENT)
Dept: SCHEDULING | Age: 61
End: 2021-08-14

## 2021-08-14 DIAGNOSIS — G23.1 PROGRESSIVE SUPRANUCLEAR PALSY (HCC): Primary | ICD-10-CM

## 2022-03-24 ENCOUNTER — APPOINTMENT (OUTPATIENT)
Dept: CT IMAGING | Age: 62
End: 2022-03-24
Attending: EMERGENCY MEDICINE
Payer: COMMERCIAL

## 2022-03-24 ENCOUNTER — HOSPITAL ENCOUNTER (EMERGENCY)
Age: 62
Discharge: HOME | End: 2022-03-24
Attending: EMERGENCY MEDICINE
Payer: COMMERCIAL

## 2022-03-24 VITALS
DIASTOLIC BLOOD PRESSURE: 87 MMHG | TEMPERATURE: 97.5 F | RESPIRATION RATE: 16 BRPM | OXYGEN SATURATION: 98 % | HEART RATE: 94 BPM | SYSTOLIC BLOOD PRESSURE: 126 MMHG

## 2022-03-24 DIAGNOSIS — S01.81XA LACERATION OF FOREHEAD, INITIAL ENCOUNTER: Primary | ICD-10-CM

## 2022-03-24 PROCEDURE — 74011250636 HC RX REV CODE- 250/636: Performed by: EMERGENCY MEDICINE

## 2022-03-24 PROCEDURE — 90471 IMMUNIZATION ADMIN: CPT

## 2022-03-24 PROCEDURE — 75810000293 HC SIMP/SUPERF WND  RPR

## 2022-03-24 PROCEDURE — 99284 EMERGENCY DEPT VISIT MOD MDM: CPT

## 2022-03-24 PROCEDURE — 74011000250 HC RX REV CODE- 250: Performed by: EMERGENCY MEDICINE

## 2022-03-24 PROCEDURE — 90715 TDAP VACCINE 7 YRS/> IM: CPT | Performed by: EMERGENCY MEDICINE

## 2022-03-24 PROCEDURE — 70450 CT HEAD/BRAIN W/O DYE: CPT

## 2022-03-24 RX ORDER — LIDOCAINE HYDROCHLORIDE AND EPINEPHRINE 10; 10 MG/ML; UG/ML
10 INJECTION, SOLUTION INFILTRATION; PERINEURAL
Status: COMPLETED | OUTPATIENT
Start: 2022-03-24 | End: 2022-03-24

## 2022-03-24 RX ORDER — BACITRACIN 500 UNIT/G
1 PACKET (EA) TOPICAL
Status: COMPLETED | OUTPATIENT
Start: 2022-03-24 | End: 2022-03-24

## 2022-03-24 RX ADMIN — LIDOCAINE HYDROCHLORIDE,EPINEPHRINE BITARTRATE 100 MG: 10; .01 INJECTION, SOLUTION INFILTRATION; PERINEURAL at 10:06

## 2022-03-24 RX ADMIN — TETANUS TOXOID, REDUCED DIPHTHERIA TOXOID AND ACELLULAR PERTUSSIS VACCINE, ADSORBED 0.5 ML: 5; 2.5; 8; 8; 2.5 SUSPENSION INTRAMUSCULAR at 10:37

## 2022-03-24 RX ADMIN — BACITRACIN 1 PACKET: 500 OINTMENT TOPICAL at 10:06

## 2022-03-24 NOTE — ED TRIAGE NOTES
Pt stated he hit his head on the banister this am, pt with laceration noted to right forehead above eyebrow, denies loc

## 2022-03-24 NOTE — DISCHARGE INSTRUCTIONS
Lacerations: Your laceration was repaired with 5 staples/sutures. They should be removed in 7 days. Keep the wound dry for 24 hours unless it gets dirty. Wash your lacerations with an antibacterial soap and water. Pat dry. Cover with a layer of antibacterial ointment and cover with bandage. Do this twice daily until healed. Once your wound has healed you should apply sunscreen over the scar for the next year to prevent further scarring while the skin fully heals.      Return to ER with any concern for wound infection: redness, significant swelling, pus-like discharge

## 2022-03-24 NOTE — ED PROVIDER NOTES
Please note that this dictation was completed with [employer], the computer voice recognition software.  Quite often unanticipated grammatical, syntax, homophones, and other interpretive errors are inadvertently transcribed by the computer software.  Please disregard these errors.  Please excuse any errors that have escaped final proofreading. Patient is a 22-year-old male with history of Parkinson's disease presenting to ED for evaluation of laceration to his head. He states that he was walking to the bathroom, fell, and hit his head on the radiator. He denies loss of consciousness or blood thinner use. He states that he is typically lightheaded at baseline and has a history of frequent falls secondary to his Parkinson's disease. Denies any worsening dizziness, chest pain, or syncope causing him to fall. Family member at bedside reporting he is at his baseline. He denies headache, facial pain, neck pain, shortness of breath, abdominal pain, vomiting, or any other medical complaints at this time. Unknown last tdap. No past medical history on file. No past surgical history on file. No family history on file.     Social History     Socioeconomic History    Marital status:      Spouse name: Not on file    Number of children: Not on file    Years of education: Not on file    Highest education level: Not on file   Occupational History    Not on file   Tobacco Use    Smoking status: Former Smoker     Years: 25.00    Smokeless tobacco: Never Used   Substance and Sexual Activity    Alcohol use: Yes     Comment: on occ     Drug use: Not on file    Sexual activity: Not on file   Other Topics Concern    Not on file   Social History Narrative    Not on file     Social Determinants of Health     Financial Resource Strain:     Difficulty of Paying Living Expenses: Not on file   Food Insecurity:     Worried About [address] in the Last Year: Not on file    [address] in the Last Year: Not on file   Transportation Needs:     Lack of Transportation (Medical): Not on file    Lack of Transportation (Non-Medical): Not on file   Physical Activity:     Days of Exercise per Week: Not on file    Minutes of Exercise per Session: Not on file   Stress:     Feeling of Stress : Not on file   Social Connections:     Frequency of Communication with Friends and Family: Not on file    Frequency of Social Gatherings with Friends and Family: Not on file    Attends Zoroastrian Services: Not on file    Active Member of [address] [place] [address] [place] [employer] or Organizations: Not on file    Attends Club or Organization Meetings: Not on file    Marital Status: Not on file   Intimate Partner Violence:     Fear of Current or Ex-Partner: Not on file    Emotionally Abused: Not on file    Physically Abused: Not on file    Sexually Abused: Not on file   Housing Stability:     Unable to Pay for Housing in the Last Year: Not on file    Number of [place] in the Last Year: Not on file    Unstable Housing in the Last Year: Not on file         ALLERGIES: Patient has no known allergies. Review of Systems   Constitutional: Negative for chills and fever. HENT: Negative for ear pain and sore throat. Eyes: Negative for visual disturbance. Respiratory: Negative for cough and shortness of breath. Cardiovascular: Negative for chest pain. Gastrointestinal: Negative for abdominal pain. Genitourinary: Negative for flank pain. Musculoskeletal: Negative for back pain. Skin: Positive for wound. Negative for color change. Neurological: Negative for dizziness and headaches. Psychiatric/Behavioral: Negative for confusion. Vitals:    03/24/22 0904   BP: 126/87   Pulse: 94   Resp: 16   Temp: 97.5 °F (36.4 °C)   SpO2: 98%            Physical Exam  Vitals and nursing note reviewed. Constitutional:       General: He is not in acute distress. Appearance: Normal appearance. He is not ill-appearing.    HENT: Head: Normocephalic. Laceration (3 cm laceration superior to the right eyebrown, bleeding controlled, no bony tenderness or instability) present. No raccoon eyes, Garvin's sign or contusion. Jaw: There is normal jaw occlusion. Eyes:      General: Vision grossly intact. Extraocular Movements: Extraocular movements intact. Conjunctiva/sclera: Conjunctivae normal.   Neck:      Trachea: Phonation normal.   Cardiovascular:      Rate and Rhythm: Normal rate and regular rhythm. Heart sounds: Normal heart sounds. Pulmonary:      Effort: Pulmonary effort is normal.      Breath sounds: Normal breath sounds and air entry. Abdominal:      Palpations: Abdomen is soft. Tenderness: There is no abdominal tenderness. Musculoskeletal:         General: Normal range of motion. Cervical back: Full passive range of motion without pain and normal range of motion. No spinous process tenderness or muscular tenderness. Skin:     General: Skin is warm and dry. Neurological:      General: No focal deficit present. Mental Status: He is alert and oriented to person, place, and time. Motor: Tremor (chronic, baseline) present. Gait: Gait abnormal (unsteady gait at baseline, walks with assistance ). Psychiatric:         Attention and Perception: Attention normal.         Mood and Affect: Mood normal.         Speech: Speech normal.          MDM  Number of Diagnoses or Management Options  Laceration of forehead, initial encounter  Diagnosis management comments: Patient is alert, afebrile, vitals stable. Presents after mechanical ground-level fall with laceration to his head. History of Parkinson's and frequent falls. Family member reports he is at his baseline. No midline spinal pain. Head CT unremarkable. The wound is cleansed, irrigated, and debrided of foreign material as much as possible. Wound edges approximated, antibiotic ointment and dressing applied.  See procedure note for details. The patient tolerated entire procedure well and is alerted to watch for any signs of infection (redness, pus, pain, increased swelling or fever) and call if such occurs. Home wound care instructions are provided. Tetanus vaccination status reviewed: tdap booster indicated and given today. ED Course as of 03/24/22 1031   Thu Mar 24, 2022   1031 CT HEAD WO CONT    IMPRESSION  No acute intracranial hemorrhage, mass or infarct. [EP]      ED Course User Index  [EP] DARIO Williamson     10:33 AM  Pt has been reevaluated. There are no new complaints, changes, or physical findings at this time. All results have been reviewed with patient and/or family. Medications have been reviewed w/ pt and/or family. Pt and/or family's questions have been answered. Pt and/or family expressed good understanding of the dx/tx/rx and is in agreement with plan of care. Pt instructed and agreed to f/u w/ PCP and to return to ED upon further deterioration. Pt is ready for discharge. IMPRESSION:  1. Laceration of forehead, initial encounter        PLAN:  1. Current Discharge Medication List        2. Follow-up Information     Follow up With Specialties Details Why Contact Info    Romulo Hernandez MD Family Medicine Go in 7 days For suture removal [address]  [phone]              Return to ED if worse     Wound Repair    Date/Time: 3/24/2022 10:31 AM  Performed by: PAPreparation: skin prepped with Betadine  Pre-procedure re-eval: Immediately prior to the procedure, the patient was reevaluated and found suitable for the planned procedure and any planned medications. Location details: face  Wound length:2.6 - 7.5 cm  Anesthesia: local infiltration    Anesthesia:  Local Anesthetic: lidocaine 1% with epinephrine  Anesthetic total: 4 mL  Foreign bodies: no foreign bodies  Irrigation solution: Wound cleanser spray.   Debridement: minimal  Skin closure: 5-0 nylon  Number of sutures: 5  Technique: simple and interrupted  Approximation: close  Dressing: antibiotic ointment and 4x4  Patient tolerance: patient tolerated the procedure well with no immediate complications  My total time at bedside, performing this procedure was 1-15 minutes.

## 2023-03-29 ENCOUNTER — DOCUMENTATION ONLY (OUTPATIENT)
Dept: INTERNAL MEDICINE CLINIC | Age: 63
End: 2023-03-29

## 2023-03-29 NOTE — PROGRESS NOTES
IDENTIFICATION: Mr. Queenie Gerber is a 66 y.o. male who is being admitted for hospice at [facility]. HISTORY OF PRESENT ILLNESS:     He was diagnosed in 2020 with supranuclear palsy, with second opinion at [facility]. He also had neuropsychological assessment with Dr. Jenifer Arteaga at [facility] neurology, revealing cognitive impairment. He has most recently been followed at [facility] neurology. He moved in with his brother. He has had progressive worsening of his neurologic deficits and is now requiring assistance with all ADL's. He has a history of frequent falls. He is largely bed-bound, ambulates with assistance. He is being admitted for hospice care at [facility]. He currently is lying in bed, gives monosyllabic (mostly \"yes no\") answers. PAST MEDICAL HISTORY: Supranuclear palsy, depression, aphasia, cognitive impairment, insomnia, CAD. PAST SURGICAL HISTORY: CABG x4 in 2013, pyloric stenosis release. ALLERGIES: Patient has no known allergies. MEDICATIONS: See list justus, reviewed today. FAMILY HISTORY: Mother had Parkinsons in her 62s. SOCIAL HISTORY:  . He is a former optometrist. He reports that he has quit smoking. He has never used smokeless tobacco. He reports current alcohol use. REVIEW OF SYSTEMS: See above; Complete ROS otherwise negative. EXAMINATION:    Vitals:  Afebrile. 104/60. Weight: 135 lb. Gen: Pleasant 66 y.o. male in NAD. HEENT: PERRLA. EOMI. OP moist and pink. Neck: Supple. No LAD. HEART: RRR, No M/G/R.  LUNGS: CTAB No W/R. Effort is unlabored. ABDOMEN: S, NT, ND, BS+. EXTREMITIES: Warm. No C/C/E. NEURO: Alert. Cranial nerves grossly intact. No focal sensory or motor deficits noted. He has a tremor. He has some bradykinesis. SKIN: Warm. Dry. No rashes or other lesions noted. ASSESSMENT/ PLAN:   Supranuclear palsy, with aphasia, cognitive impairment: Under hospice care. Depression: Fluoxetine. Insomnia: Trazodone. CAD. Dom Flaherty MD FACP

## 2024-09-26 ENCOUNTER — TELEPHONE (OUTPATIENT)
Dept: OTHER | Facility: CLINIC | Age: 64
End: 2024-09-26